# Patient Record
Sex: MALE | Race: WHITE | NOT HISPANIC OR LATINO | URBAN - METROPOLITAN AREA
[De-identification: names, ages, dates, MRNs, and addresses within clinical notes are randomized per-mention and may not be internally consistent; named-entity substitution may affect disease eponyms.]

---

## 2023-02-15 ENCOUNTER — OUTPATIENT (OUTPATIENT)
Dept: OUTPATIENT SERVICES | Facility: HOSPITAL | Age: 68
LOS: 1 days | End: 2023-02-15
Payer: MEDICARE

## 2023-02-15 DIAGNOSIS — Z11.52 ENCOUNTER FOR SCREENING FOR COVID-19: ICD-10-CM

## 2023-02-15 LAB — SARS-COV-2 RNA SPEC QL NAA+PROBE: SIGNIFICANT CHANGE UP

## 2023-02-15 PROCEDURE — U0005: CPT

## 2023-02-15 PROCEDURE — U0003: CPT

## 2023-02-15 PROCEDURE — C9803: CPT

## 2023-02-16 ENCOUNTER — TRANSCRIPTION ENCOUNTER (OUTPATIENT)
Age: 68
End: 2023-02-16

## 2023-02-16 ENCOUNTER — OUTPATIENT (OUTPATIENT)
Dept: OUTPATIENT SERVICES | Facility: HOSPITAL | Age: 68
LOS: 1 days | End: 2023-02-16
Payer: COMMERCIAL

## 2023-02-16 VITALS
OXYGEN SATURATION: 99 % | RESPIRATION RATE: 16 BRPM | HEART RATE: 56 BPM | SYSTOLIC BLOOD PRESSURE: 151 MMHG | TEMPERATURE: 98 F | DIASTOLIC BLOOD PRESSURE: 50 MMHG

## 2023-02-16 VITALS
WEIGHT: 265 LBS | TEMPERATURE: 98 F | OXYGEN SATURATION: 96 % | SYSTOLIC BLOOD PRESSURE: 173 MMHG | DIASTOLIC BLOOD PRESSURE: 73 MMHG | HEIGHT: 75 IN | HEART RATE: 58 BPM | RESPIRATION RATE: 16 BRPM

## 2023-02-16 DIAGNOSIS — Z98.890 OTHER SPECIFIED POSTPROCEDURAL STATES: Chronic | ICD-10-CM

## 2023-02-16 DIAGNOSIS — I20.0 UNSTABLE ANGINA: ICD-10-CM

## 2023-02-16 PROBLEM — Z00.00 ENCOUNTER FOR PREVENTIVE HEALTH EXAMINATION: Status: ACTIVE | Noted: 2023-02-16

## 2023-02-16 LAB
ANION GAP SERPL CALC-SCNC: 15 MMOL/L — SIGNIFICANT CHANGE UP (ref 5–17)
BUN SERPL-MCNC: 16 MG/DL — SIGNIFICANT CHANGE UP (ref 7–23)
CALCIUM SERPL-MCNC: 10 MG/DL — SIGNIFICANT CHANGE UP (ref 8.4–10.5)
CHLORIDE SERPL-SCNC: 100 MMOL/L — SIGNIFICANT CHANGE UP (ref 96–108)
CO2 SERPL-SCNC: 22 MMOL/L — SIGNIFICANT CHANGE UP (ref 22–31)
CREAT SERPL-MCNC: 0.59 MG/DL — SIGNIFICANT CHANGE UP (ref 0.5–1.3)
EGFR: 106 ML/MIN/1.73M2 — SIGNIFICANT CHANGE UP
GLUCOSE BLDC GLUCOMTR-MCNC: 165 MG/DL — HIGH (ref 70–99)
GLUCOSE BLDC GLUCOMTR-MCNC: 168 MG/DL — HIGH (ref 70–99)
GLUCOSE BLDC GLUCOMTR-MCNC: 177 MG/DL — HIGH (ref 70–99)
GLUCOSE SERPL-MCNC: 167 MG/DL — HIGH (ref 70–99)
HCT VFR BLD CALC: 44 % — SIGNIFICANT CHANGE UP (ref 39–50)
HGB BLD-MCNC: 14.4 G/DL — SIGNIFICANT CHANGE UP (ref 13–17)
MCHC RBC-ENTMCNC: 28.7 PG — SIGNIFICANT CHANGE UP (ref 27–34)
MCHC RBC-ENTMCNC: 32.7 GM/DL — SIGNIFICANT CHANGE UP (ref 32–36)
MCV RBC AUTO: 87.6 FL — SIGNIFICANT CHANGE UP (ref 80–100)
NRBC # BLD: 0 /100 WBCS — SIGNIFICANT CHANGE UP (ref 0–0)
PLATELET # BLD AUTO: 216 K/UL — SIGNIFICANT CHANGE UP (ref 150–400)
POTASSIUM SERPL-MCNC: 4 MMOL/L — SIGNIFICANT CHANGE UP (ref 3.5–5.3)
POTASSIUM SERPL-SCNC: 4 MMOL/L — SIGNIFICANT CHANGE UP (ref 3.5–5.3)
RBC # BLD: 5.02 M/UL — SIGNIFICANT CHANGE UP (ref 4.2–5.8)
RBC # FLD: 12.9 % — SIGNIFICANT CHANGE UP (ref 10.3–14.5)
SODIUM SERPL-SCNC: 137 MMOL/L — SIGNIFICANT CHANGE UP (ref 135–145)
WBC # BLD: 8.93 K/UL — SIGNIFICANT CHANGE UP (ref 3.8–10.5)
WBC # FLD AUTO: 8.93 K/UL — SIGNIFICANT CHANGE UP (ref 3.8–10.5)

## 2023-02-16 PROCEDURE — C1725: CPT

## 2023-02-16 PROCEDURE — 85027 COMPLETE CBC AUTOMATED: CPT

## 2023-02-16 PROCEDURE — 93010 ELECTROCARDIOGRAM REPORT: CPT

## 2023-02-16 PROCEDURE — C1769: CPT

## 2023-02-16 PROCEDURE — 93458 L HRT ARTERY/VENTRICLE ANGIO: CPT | Mod: 59

## 2023-02-16 PROCEDURE — 93005 ELECTROCARDIOGRAM TRACING: CPT

## 2023-02-16 PROCEDURE — 92928 PRQ TCAT PLMT NTRAC ST 1 LES: CPT | Mod: RC

## 2023-02-16 PROCEDURE — C9600: CPT | Mod: RC

## 2023-02-16 PROCEDURE — C1874: CPT

## 2023-02-16 PROCEDURE — C1894: CPT

## 2023-02-16 PROCEDURE — 93458 L HRT ARTERY/VENTRICLE ANGIO: CPT | Mod: 26,59

## 2023-02-16 PROCEDURE — C1887: CPT

## 2023-02-16 PROCEDURE — 93010 ELECTROCARDIOGRAM REPORT: CPT | Mod: 77

## 2023-02-16 PROCEDURE — 99152 MOD SED SAME PHYS/QHP 5/>YRS: CPT

## 2023-02-16 PROCEDURE — 82962 GLUCOSE BLOOD TEST: CPT

## 2023-02-16 PROCEDURE — 80048 BASIC METABOLIC PNL TOTAL CA: CPT

## 2023-02-16 RX ORDER — SODIUM CHLORIDE 9 MG/ML
3 INJECTION INTRAMUSCULAR; INTRAVENOUS; SUBCUTANEOUS EVERY 8 HOURS
Refills: 0 | Status: DISCONTINUED | OUTPATIENT
Start: 2023-02-16 | End: 2023-03-02

## 2023-02-16 RX ORDER — INSULIN LISPRO 100/ML
VIAL (ML) SUBCUTANEOUS
Refills: 0 | Status: DISCONTINUED | OUTPATIENT
Start: 2023-02-16 | End: 2023-03-02

## 2023-02-16 RX ORDER — DEXTROSE 50 % IN WATER 50 %
25 SYRINGE (ML) INTRAVENOUS ONCE
Refills: 0 | Status: DISCONTINUED | OUTPATIENT
Start: 2023-02-16 | End: 2023-03-02

## 2023-02-16 RX ORDER — DEXTROSE 50 % IN WATER 50 %
15 SYRINGE (ML) INTRAVENOUS ONCE
Refills: 0 | Status: DISCONTINUED | OUTPATIENT
Start: 2023-02-16 | End: 2023-03-02

## 2023-02-16 RX ORDER — CLOPIDOGREL BISULFATE 75 MG/1
75 TABLET, FILM COATED ORAL DAILY
Refills: 0 | Status: DISCONTINUED | OUTPATIENT
Start: 2023-02-16 | End: 2023-03-02

## 2023-02-16 RX ORDER — CLOPIDOGREL BISULFATE 75 MG/1
1 TABLET, FILM COATED ORAL
Qty: 90 | Refills: 3
Start: 2023-02-16 | End: 2024-02-10

## 2023-02-16 RX ORDER — DEXTROSE 50 % IN WATER 50 %
12.5 SYRINGE (ML) INTRAVENOUS ONCE
Refills: 0 | Status: DISCONTINUED | OUTPATIENT
Start: 2023-02-16 | End: 2023-03-02

## 2023-02-16 RX ORDER — SODIUM CHLORIDE 9 MG/ML
1000 INJECTION INTRAMUSCULAR; INTRAVENOUS; SUBCUTANEOUS
Refills: 0 | Status: DISCONTINUED | OUTPATIENT
Start: 2023-02-16 | End: 2023-03-02

## 2023-02-16 RX ORDER — ASPIRIN/CALCIUM CARB/MAGNESIUM 324 MG
81 TABLET ORAL DAILY
Refills: 0 | Status: DISCONTINUED | OUTPATIENT
Start: 2023-02-16 | End: 2023-03-02

## 2023-02-16 RX ORDER — SODIUM CHLORIDE 9 MG/ML
1000 INJECTION, SOLUTION INTRAVENOUS
Refills: 0 | Status: DISCONTINUED | OUTPATIENT
Start: 2023-02-16 | End: 2023-03-02

## 2023-02-16 RX ORDER — GLUCAGON INJECTION, SOLUTION 0.5 MG/.1ML
1 INJECTION, SOLUTION SUBCUTANEOUS ONCE
Refills: 0 | Status: DISCONTINUED | OUTPATIENT
Start: 2023-02-16 | End: 2023-03-02

## 2023-02-16 RX ADMIN — SODIUM CHLORIDE 3 MILLILITER(S): 9 INJECTION INTRAMUSCULAR; INTRAVENOUS; SUBCUTANEOUS at 13:35

## 2023-02-16 RX ADMIN — SODIUM CHLORIDE 75 MILLILITER(S): 9 INJECTION INTRAMUSCULAR; INTRAVENOUS; SUBCUTANEOUS at 10:30

## 2023-02-16 RX ADMIN — Medication 2: at 16:25

## 2023-02-16 NOTE — H&P CARDIOLOGY - HISTORY OF PRESENT ILLNESS
68 yo M with PMHx of HTN, HLD, DMT2, family hx of MI (father @ 50), Acid reflux, former smoker presents for cardiac cath. pt reports he had abnormal CTA in 11/2022 then had abnormal NST at Dr. Dick's office and recommended to have cardiac cath. Pt denies chest pain, SOB, dizziness or palpitation.     Card: Pacheco Dick    NST 2/14/23: mod abnormal study with anterior and apical ischemia without infarct EF 43%, Abnormal wall motion, with hypokinesis present. Mod sized area of moderate hypokinesis in anterior and apical walls.

## 2023-02-16 NOTE — ASU DISCHARGE PLAN (ADULT/PEDIATRIC) - CARE PROVIDER_API CALL
Pacheco Dick (DO)  Cardiology; Internal Medicine; Nuclear Cardiology  [facility], [address] [place] [address], [place] [address]  Phone: [phone]  Fax: [phone]  Established Patient  Follow Up Time: 2 weeks

## 2023-02-16 NOTE — H&P CARDIOLOGY - NSICDXPASTMEDICALHX_GEN_ALL_CORE_FT
PAST MEDICAL HISTORY:  Acid reflux     DM (diabetes mellitus)     Former smoker     HLD (hyperlipidemia)     HTN (hypertension)

## 2023-02-16 NOTE — ASU DISCHARGE PLAN (ADULT/PEDIATRIC) - NS MD DC FALL RISK RISK
For information on Fall & Injury Prevention, visit: [email or web address] OR  [email or web address] OR  https://www.cdc.gov/steadi/patient.html

## 2023-02-26 ENCOUNTER — OUTPATIENT (OUTPATIENT)
Dept: OUTPATIENT SERVICES | Facility: HOSPITAL | Age: 68
LOS: 1 days | End: 2023-02-26
Payer: COMMERCIAL

## 2023-02-26 DIAGNOSIS — Z98.890 OTHER SPECIFIED POSTPROCEDURAL STATES: Chronic | ICD-10-CM

## 2023-02-26 DIAGNOSIS — Z11.52 ENCOUNTER FOR SCREENING FOR COVID-19: ICD-10-CM

## 2023-02-26 LAB — SARS-COV-2 RNA SPEC QL NAA+PROBE: SIGNIFICANT CHANGE UP

## 2023-02-26 PROCEDURE — U0005: CPT

## 2023-02-26 PROCEDURE — C9803: CPT

## 2023-02-26 PROCEDURE — U0003: CPT

## 2023-02-27 ENCOUNTER — NON-APPOINTMENT (OUTPATIENT)
Age: 68
End: 2023-02-27

## 2023-03-01 ENCOUNTER — OUTPATIENT (OUTPATIENT)
Dept: OUTPATIENT SERVICES | Facility: HOSPITAL | Age: 68
LOS: 1 days | End: 2023-03-01
Payer: MEDICARE

## 2023-03-01 ENCOUNTER — TRANSCRIPTION ENCOUNTER (OUTPATIENT)
Age: 68
End: 2023-03-01

## 2023-03-01 VITALS
TEMPERATURE: 98 F | HEART RATE: 75 BPM | DIASTOLIC BLOOD PRESSURE: 69 MMHG | SYSTOLIC BLOOD PRESSURE: 136 MMHG | WEIGHT: 265 LBS | HEIGHT: 75 IN | RESPIRATION RATE: 15 BRPM | OXYGEN SATURATION: 97 %

## 2023-03-01 VITALS
OXYGEN SATURATION: 100 % | TEMPERATURE: 98 F | RESPIRATION RATE: 17 BRPM | DIASTOLIC BLOOD PRESSURE: 67 MMHG | SYSTOLIC BLOOD PRESSURE: 144 MMHG | HEART RATE: 50 BPM

## 2023-03-01 DIAGNOSIS — Z98.890 OTHER SPECIFIED POSTPROCEDURAL STATES: Chronic | ICD-10-CM

## 2023-03-01 DIAGNOSIS — I25.10 ATHEROSCLEROTIC HEART DISEASE OF NATIVE CORONARY ARTERY WITHOUT ANGINA PECTORIS: ICD-10-CM

## 2023-03-01 LAB
ANION GAP SERPL CALC-SCNC: 11 MMOL/L — SIGNIFICANT CHANGE UP (ref 5–17)
BUN SERPL-MCNC: 15 MG/DL — SIGNIFICANT CHANGE UP (ref 7–23)
CALCIUM SERPL-MCNC: 9.9 MG/DL — SIGNIFICANT CHANGE UP (ref 8.4–10.5)
CHLORIDE SERPL-SCNC: 102 MMOL/L — SIGNIFICANT CHANGE UP (ref 96–108)
CO2 SERPL-SCNC: 26 MMOL/L — SIGNIFICANT CHANGE UP (ref 22–31)
CREAT SERPL-MCNC: 0.62 MG/DL — SIGNIFICANT CHANGE UP (ref 0.5–1.3)
EGFR: 104 ML/MIN/1.73M2 — SIGNIFICANT CHANGE UP
GLUCOSE BLDC GLUCOMTR-MCNC: 102 MG/DL — HIGH (ref 70–99)
GLUCOSE SERPL-MCNC: 129 MG/DL — HIGH (ref 70–99)
HCT VFR BLD CALC: 43.7 % — SIGNIFICANT CHANGE UP (ref 39–50)
HGB BLD-MCNC: 14.2 G/DL — SIGNIFICANT CHANGE UP (ref 13–17)
MCHC RBC-ENTMCNC: 28.3 PG — SIGNIFICANT CHANGE UP (ref 27–34)
MCHC RBC-ENTMCNC: 32.5 GM/DL — SIGNIFICANT CHANGE UP (ref 32–36)
MCV RBC AUTO: 87.2 FL — SIGNIFICANT CHANGE UP (ref 80–100)
NRBC # BLD: 0 /100 WBCS — SIGNIFICANT CHANGE UP (ref 0–0)
PLATELET # BLD AUTO: 208 K/UL — SIGNIFICANT CHANGE UP (ref 150–400)
POTASSIUM SERPL-MCNC: 4.6 MMOL/L — SIGNIFICANT CHANGE UP (ref 3.5–5.3)
POTASSIUM SERPL-SCNC: 4.6 MMOL/L — SIGNIFICANT CHANGE UP (ref 3.5–5.3)
RBC # BLD: 5.01 M/UL — SIGNIFICANT CHANGE UP (ref 4.2–5.8)
RBC # FLD: 12.8 % — SIGNIFICANT CHANGE UP (ref 10.3–14.5)
SODIUM SERPL-SCNC: 139 MMOL/L — SIGNIFICANT CHANGE UP (ref 135–145)
WBC # BLD: 8.18 K/UL — SIGNIFICANT CHANGE UP (ref 3.8–10.5)
WBC # FLD AUTO: 8.18 K/UL — SIGNIFICANT CHANGE UP (ref 3.8–10.5)

## 2023-03-01 PROCEDURE — 92928 PRQ TCAT PLMT NTRAC ST 1 LES: CPT | Mod: LC

## 2023-03-01 PROCEDURE — C1874: CPT

## 2023-03-01 PROCEDURE — C9601: CPT | Mod: LC

## 2023-03-01 PROCEDURE — C1894: CPT

## 2023-03-01 PROCEDURE — 93010 ELECTROCARDIOGRAM REPORT: CPT

## 2023-03-01 PROCEDURE — C1725: CPT

## 2023-03-01 PROCEDURE — 36415 COLL VENOUS BLD VENIPUNCTURE: CPT

## 2023-03-01 PROCEDURE — 85027 COMPLETE CBC AUTOMATED: CPT

## 2023-03-01 PROCEDURE — 99152 MOD SED SAME PHYS/QHP 5/>YRS: CPT

## 2023-03-01 PROCEDURE — 92929: CPT | Mod: LC

## 2023-03-01 PROCEDURE — 80048 BASIC METABOLIC PNL TOTAL CA: CPT

## 2023-03-01 PROCEDURE — C9600: CPT | Mod: LC

## 2023-03-01 PROCEDURE — C1887: CPT

## 2023-03-01 PROCEDURE — 82962 GLUCOSE BLOOD TEST: CPT

## 2023-03-01 PROCEDURE — 93005 ELECTROCARDIOGRAM TRACING: CPT

## 2023-03-01 PROCEDURE — C1769: CPT

## 2023-03-01 RX ORDER — SODIUM CHLORIDE 9 MG/ML
1000 INJECTION INTRAMUSCULAR; INTRAVENOUS; SUBCUTANEOUS
Refills: 0 | Status: ACTIVE | OUTPATIENT
Start: 2023-03-01 | End: 2023-03-01

## 2023-03-01 RX ORDER — METFORMIN HYDROCHLORIDE 850 MG/1
1 TABLET ORAL
Qty: 0 | Refills: 0 | DISCHARGE

## 2023-03-01 RX ADMIN — SODIUM CHLORIDE 125 MILLILITER(S): 9 INJECTION INTRAMUSCULAR; INTRAVENOUS; SUBCUTANEOUS at 16:15

## 2023-03-01 NOTE — ASU DISCHARGE PLAN (ADULT/PEDIATRIC) - ASU DC SPECIAL INSTRUCTIONSFT
Wound Care:   the day AFTER your procedure remove bandage GENTLY, and clean using  mild soap and gentle warm, water stream, pat dry. leave OPEN to air. YOU MAY SHOWER   DO NOT apply lotions, creams, ointments, powder, perfumes to your incision site  DO NOT SOAK your site for 1 week ( no baths, no pools, no tubs, etc...)  Check  your groin and /or wrist daily. A small amount of bruising, and soarness are normal  ACTIVITY: for 24 hours   - DO NOT DRIVE  - DO NOT make any important decisions or sign legal documents   - DO NOT operate heavy machineries   - you may resume sexual activity in 48 hours, unless otherwise instructed by your cardiologist   If your procedure was done through the WRIST: for the NEXT 3DAYS:  - avoid pushing, pulling, with that affected wrist   - avoid repeated movement of that hand and wrist ( eg: typing, hammering)  - DO NOT LIFT anything more than 5 lbs   MEDICATION:   take your medications as explained ( see discharge paperwork)   If you received a STENT, you will be taking antiplatelet medications to KEEP YOUR STENT OPEN ( eg: Aspirin, Plavix, Brilinta, Effient, etc).  Take as prescribed DO NOT STOP taking them without consulting with your cardiologist first.   Follow heart healthy diet recommended by your doctor, , if you smoke STOP SMOKING ( may call [phone] for center of tobacco control if you need assistance)   CALL your doctor to make appointment in 2 WEEKS   ***CALL YOUR DOCTOR***  if you experience: fever, chills, body aches, or severe pain, swelling, redness, heat or yellow discharge at incision site  If you experience Bleeding or excruciating pain at the procedural site, swelling ( golf ball size) at your procedural site  If you experience CHEST PAIN  If you experience extremity numbness, tingling, temperature change ( of your procedural site)   If you are unable to reach your doctor, you may contact:   -Cardiology Office at [facility] at [phone] or   - [facility] [place] [facility] [phone]  - [facility] [phone]

## 2023-03-01 NOTE — H&P CARDIOLOGY - HISTORY OF PRESENT ILLNESS
Successful PCI with ERIC to RPDA and mid RCA.  Plan staged PCI of LCX  and OM in 2-3 weeks.  DAPT for 6 mths    Successful PCI with ERIC to RPDA and mid RCA.  Plan staged PCI of LCX  and OM in 2-3 weeks.  DAPT for 6 mths 68 yo M with PMHx of HTN, HLD, DMT2, family hx of MI (father @ 50), Acid reflux, former smoker presents today for staged intervention. Patient was admitted on 2/16/2023 for Successful PCI with ERIC to RPDA and mid RCA.  Returns for staged PCI of LCX  and OM  68 yo M with PMHx of HTN, HLD, DMT2, family hx of MI (father @ 50), Acid reflux, former smoker presents today for staged intervention. Patient was admitted on 2/16/2023 for Successful PCI with ERIC to RPDA and mid RCA.  Returns for staged PCI of LCX  and OM     Card: Pacheco Dick

## 2023-03-01 NOTE — H&P CARDIOLOGY - PSYCHIATRIC
Affect and characteristics of appearance, verbalizations, behaviors are appropriate
Diagnostic testing not indicated for today's encounter

## 2023-03-01 NOTE — CHART NOTE - NSCHARTNOTEFT_GEN_A_CORE
Pt noted to have 2.0 sec pause on tele: HR SB-SR. Pt is not on beta blocker, asymptomatic.   Dr Jauregui aware, will proceed for discharge later tonight if pt remains hemodynamically stable.     Diogo Perez, NP

## 2023-03-01 NOTE — ASU DISCHARGE PLAN (ADULT/PEDIATRIC) - CARE PROVIDER_API CALL
Pacheco Dick (DO)  Cardiology; Internal Medicine; Nuclear Cardiology  [facility], [address] [place] [address], [place] [address]  Phone: [phone]  Fax: [phone]  Established Patient  Follow Up Time: 2 weeks

## 2023-03-01 NOTE — H&P CARDIOLOGY - NSICDXPASTMEDICALHX_GEN_ALL_CORE_FT
PAST MEDICAL HISTORY:  CAD (coronary atherosclerotic disease)     Diabetes     H/O heart artery stent 2/2023    HLD (hyperlipidemia)     HTN (hypertension)      PAST MEDICAL HISTORY:  CAD (coronary atherosclerotic disease)     Diabetes     Former smoker     H/O heart artery stent 2/2023    HLD (hyperlipidemia)     HTN (hypertension)

## 2023-03-01 NOTE — ASU DISCHARGE PLAN (ADULT/PEDIATRIC) - NS MD DC FALL RISK RISK
For information on Fall & Injury Prevention, visit: [email or web address] OR  [email or web address] OR  https://www.cdc.gov/steadi/patient.html

## 2023-04-05 RX ORDER — METFORMIN HYDROCHLORIDE 850 MG/1
2 TABLET ORAL
Qty: 0 | Refills: 0 | DISCHARGE

## 2023-04-05 RX ORDER — METFORMIN HYDROCHLORIDE 850 MG/1
1 TABLET ORAL
Qty: 0 | Refills: 0 | DISCHARGE

## 2023-05-17 ENCOUNTER — APPOINTMENT (OUTPATIENT)
Dept: CV DIAGNOSITCS | Facility: HOSPITAL | Age: 68
End: 2023-05-17

## 2024-01-24 ENCOUNTER — APPOINTMENT (OUTPATIENT)
Dept: ELECTROPHYSIOLOGY | Facility: CLINIC | Age: 69
End: 2024-01-24
Payer: COMMERCIAL

## 2024-01-24 VITALS
SYSTOLIC BLOOD PRESSURE: 142 MMHG | DIASTOLIC BLOOD PRESSURE: 72 MMHG | WEIGHT: 279 LBS | HEART RATE: 66 BPM | HEIGHT: 75 IN | BODY MASS INDEX: 34.69 KG/M2

## 2024-01-24 DIAGNOSIS — I47.19 OTHER SUPRAVENTRICULAR TACHYCARDIA: ICD-10-CM

## 2024-01-24 PROBLEM — E78.5 HYPERLIPIDEMIA, UNSPECIFIED: Chronic | Status: ACTIVE | Noted: 2023-03-01

## 2024-01-24 PROBLEM — I25.10 ATHEROSCLEROTIC HEART DISEASE OF NATIVE CORONARY ARTERY WITHOUT ANGINA PECTORIS: Chronic | Status: ACTIVE | Noted: 2023-03-01

## 2024-01-24 PROBLEM — I10 ESSENTIAL (PRIMARY) HYPERTENSION: Chronic | Status: ACTIVE | Noted: 2023-03-01

## 2024-01-24 PROBLEM — E11.9 TYPE 2 DIABETES MELLITUS WITHOUT COMPLICATIONS: Chronic | Status: ACTIVE | Noted: 2023-02-16

## 2024-01-24 PROBLEM — I10 ESSENTIAL (PRIMARY) HYPERTENSION: Chronic | Status: ACTIVE | Noted: 2023-02-16

## 2024-01-24 PROBLEM — Z87.891 PERSONAL HISTORY OF NICOTINE DEPENDENCE: Chronic | Status: ACTIVE | Noted: 2023-02-16

## 2024-01-24 PROBLEM — E11.9 TYPE 2 DIABETES MELLITUS WITHOUT COMPLICATIONS: Chronic | Status: ACTIVE | Noted: 2023-03-01

## 2024-01-24 PROBLEM — K21.9 GASTRO-ESOPHAGEAL REFLUX DISEASE WITHOUT ESOPHAGITIS: Chronic | Status: ACTIVE | Noted: 2023-02-16

## 2024-01-24 PROBLEM — Z87.891 PERSONAL HISTORY OF NICOTINE DEPENDENCE: Chronic | Status: ACTIVE | Noted: 2023-03-01

## 2024-01-24 PROBLEM — Z95.5 PRESENCE OF CORONARY ANGIOPLASTY IMPLANT AND GRAFT: Chronic | Status: ACTIVE | Noted: 2023-03-01

## 2024-01-24 PROBLEM — E78.5 HYPERLIPIDEMIA, UNSPECIFIED: Chronic | Status: ACTIVE | Noted: 2023-02-16

## 2024-01-24 PROCEDURE — 93000 ELECTROCARDIOGRAM COMPLETE: CPT

## 2024-01-24 PROCEDURE — 99244 OFF/OP CNSLTJ NEW/EST MOD 40: CPT

## 2024-01-24 NOTE — PHYSICAL EXAM
[Well Developed] : well developed [Well Nourished] : well nourished [No Acute Distress] : no acute distress [Obese] : obese [Normal Conjunctiva] : normal conjunctiva [Normal Venous Pressure] : normal venous pressure [Normal S1, S2] : normal S1, S2 [No Murmur] : no murmur [No Gallop] : no gallop [Clear Lung Fields] : clear lung fields [Good Air Entry] : good air entry [No Respiratory Distress] : no respiratory distress  [Soft] : abdomen soft [Normal Gait] : normal gait [No Edema] : no edema [No Cyanosis] : no cyanosis [No Clubbing] : no clubbing [No Rash] : no rash [Moves all extremities] : moves all extremities [No Focal Deficits] : no focal deficits [Alert and Oriented] : alert and oriented

## 2024-01-24 NOTE — REVIEW OF SYSTEMS
[SOB] : no shortness of breath [Dyspnea on exertion] : not dyspnea during exertion [Chest Discomfort] : no chest discomfort [Lower Ext Edema] : no extremity edema [Leg Claudication] : no intermittent leg claudication [Palpitations] : no palpitations [Syncope] : no syncope [Dizziness] : no dizziness [Convulsions] : no convulsions [Negative] : Heme/Lymph

## 2024-01-24 NOTE — DISCUSSION/SUMMARY
[FreeTextEntry1] : 68 year old gentleman with history of CAD s/p PCI (last 3/23), DM, HTN, obesity, carotid disease, HLD, presenting for evaluation of atrial ectopy and suspected AF.  He has been noted to have frequent atrial ectopy, and short runs of pAT/AF on recent monitoring, with episodes lasting up to 10 seconds. These episodes are suspicious for pAF, but given the very short duration do not make criteria yet for AF diagnosis. Given his risk factors, including LA enlargement and the findings on monitoring, I do think his likelihood of having unrecognized pAF is high. He has a CHADSVASc=4, and would warrant long-term anticoagulation if AF is identified. He is currently on ASA and plavix, but with AF would warrant addition of a DOAC+ single antiplatelet therapy.  I have recommended further monitoring the clarify the diagnosis. Monitoring options including repeated long term event monitor, wearable smart monitors, or ILR implant were discussed. He had difficulty with the wearable monitor, and prefers to proceed with ILR implant. I agree ILR would have the highest sensitivity for accurate diagnosis. The risks and benefits of ILR were reviewed, and he does want to proceed.  -ILR implant. If AF clarified would start DOAC as above.   -EP followup after this [EKG obtained to assist in diagnosis and management of assessed problem(s)] : EKG obtained to assist in diagnosis and management of assessed problem(s)

## 2024-01-24 NOTE — CARDIOLOGY SUMMARY
[de-identified] : 1/24/24 sinus rhythm 66 bpm with APCs, and nonspecific ST/Tw abnormality, LA enlargement  [de-identified] : TTE 6/29/23 LVEF 58%, mild LVH, LA 4.2cm, trace MR, mild PI [de-identified] : Cath 3/1/23 dLCx 99%, pLC 70%,  OM3 80% s/p PCI Cath 2/16/23dLCx 100%, RPDA 90%, mRCA 70%. LAD mild disease. PCI to RPDA and mRCA  [de-identified] : Carotid 1/4/24- 50-79% stenosis in LICA

## 2024-01-24 NOTE — HISTORY OF PRESENT ILLNESS
[FreeTextEntry1] : 68 year old gentleman with history of CAD s/p PCI (last 3/23), DM, HTN, obesity, carotid disease, HLD, presenting for evaluation of atrial ectopy and suspected AF.   He was noted on ECG in 1/2024 to have sinus rhythm with frequent APCs and sinus pauses. Subsequently he was referred for monitoring.   An event monitor  1/11-1/14/24 revealed sinus rhythm (avg 62, range  bpm), with brief episodes of pAT (up to 20 beats, 10.9 seconds), and 3.3% PACs. He did have difficulty tolerating the monitor, He denies symptoms during monitoring.   He has been feeling generally well, but notes occasional lightheadedness. He denies palpitation, syncope, cp or dyspnea. 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 He notes stable dyspnea with strenuous exertion.   ECG today reveals sinus rhythm 66 bpm with APCs, and nonspecific ST/Tw abnormality, and LA enlargement   Current meds include ASA 81, plavix, Toprol 50, benazepril-HCTZ, atorvastatin, metformin, Ozempic, rabeprazole

## 2024-02-13 ENCOUNTER — TRANSCRIPTION ENCOUNTER (OUTPATIENT)
Age: 69
End: 2024-02-13

## 2024-02-13 ENCOUNTER — OUTPATIENT (OUTPATIENT)
Dept: OUTPATIENT SERVICES | Facility: HOSPITAL | Age: 69
LOS: 1 days | End: 2024-02-13
Payer: MEDICARE

## 2024-02-13 VITALS
SYSTOLIC BLOOD PRESSURE: 179 MMHG | HEIGHT: 75 IN | DIASTOLIC BLOOD PRESSURE: 84 MMHG | WEIGHT: 274.92 LBS | TEMPERATURE: 98 F | HEART RATE: 82 BPM | OXYGEN SATURATION: 98 % | RESPIRATION RATE: 16 BRPM

## 2024-02-13 DIAGNOSIS — I48.0 PAROXYSMAL ATRIAL FIBRILLATION: ICD-10-CM

## 2024-02-13 DIAGNOSIS — Z98.890 OTHER SPECIFIED POSTPROCEDURAL STATES: Chronic | ICD-10-CM

## 2024-02-13 PROCEDURE — 33285 INSJ SUBQ CAR RHYTHM MNTR: CPT

## 2024-02-13 PROCEDURE — C1764: CPT

## 2024-02-13 RX ORDER — ASPIRIN/CALCIUM CARB/MAGNESIUM 324 MG
1 TABLET ORAL
Qty: 0 | Refills: 0 | DISCHARGE

## 2024-02-13 RX ORDER — ATORVASTATIN CALCIUM 80 MG/1
1 TABLET, FILM COATED ORAL
Qty: 0 | Refills: 0 | DISCHARGE

## 2024-02-13 RX ORDER — OMEGA-3 ACID ETHYL ESTERS 1 G
0 CAPSULE ORAL
Qty: 0 | Refills: 0 | DISCHARGE

## 2024-02-13 RX ORDER — RABEPRAZOLE 20 MG/1
1 TABLET, DELAYED RELEASE ORAL
Qty: 0 | Refills: 0 | DISCHARGE

## 2024-02-13 RX ORDER — SEMAGLUTIDE 0.68 MG/ML
1 INJECTION, SOLUTION SUBCUTANEOUS
Refills: 0 | DISCHARGE

## 2024-02-13 RX ORDER — CEPHALEXIN 500 MG
500 CAPSULE ORAL ONCE
Refills: 0 | Status: COMPLETED | OUTPATIENT
Start: 2024-02-13 | End: 2024-02-13

## 2024-02-13 RX ORDER — METFORMIN HYDROCHLORIDE 850 MG/1
1 TABLET ORAL
Qty: 0 | Refills: 0 | DISCHARGE

## 2024-02-13 RX ORDER — CHOLECALCIFEROL (VITAMIN D3) 125 MCG
1 CAPSULE ORAL
Qty: 0 | Refills: 0 | DISCHARGE

## 2024-02-13 RX ORDER — METFORMIN HYDROCHLORIDE 850 MG/1
2 TABLET ORAL
Refills: 0 | DISCHARGE

## 2024-02-13 RX ORDER — BENAZEPRIL HYDROCHLORIDE AND HYDROCHLOROTHIAZIDE 10; 12.5 MG/1; MG/1
1 TABLET, FILM COATED ORAL
Qty: 0 | Refills: 0 | DISCHARGE

## 2024-02-13 RX ORDER — CLOPIDOGREL BISULFATE 75 MG/1
1 TABLET, FILM COATED ORAL
Qty: 0 | Refills: 0 | DISCHARGE

## 2024-02-13 RX ADMIN — Medication 500 MILLIGRAM(S): at 07:07

## 2024-02-13 NOTE — DISCHARGE NOTE NURSING/CASE MANAGEMENT/SOCIAL WORK - PATIENT PORTAL LINK FT
You can access the FollowMyHealth Patient Portal offered by [facility] by registering at the following website: [email or web address]. By joining [facility]’s FollowMyHealth portal, you will also be able to view your health information using other applications (apps) compatible with our system.

## 2024-02-13 NOTE — PROGRESS NOTE ADULT - SUBJECTIVE AND OBJECTIVE BOX
Procedure: Loop Recorder Implant  Electrophysiologist: Jose A Lackey MD    Pt doing well s/p MDT loop recorder implant. Denies complaint.     Incision: Dermabond C/D/I; no bleeding, hematoma, erythema, exudate or edema    Plan:   Resume PO intake.   Ambulate w/ assist, then progress as tolerated.     Pain control with PO analgesia PRN.   Resume home medications.   D/C home once all criteria met, with outpt f/up in 1-2 weeks.     
Pt presents for elective MDT ILR implant.  Office note from 1/24 reviewed and there have been no significant changes in pmhx or pshx.

## 2024-02-13 NOTE — ASU DISCHARGE PLAN (ADULT/PEDIATRIC) - CARE PROVIDER_API CALL
Pacheco Dick  Cardiology  [address] [facility] [address], [address]  [place] [address]  Phone: [phone]  Fax: [phone]  Follow Up Time:     Jose A Lackey  Cardiac Electrophysiology  [address] [place] [id number]  Phone: [phone]  Fax: [phone]  Follow Up Time:

## 2024-02-13 NOTE — ASU DISCHARGE PLAN (ADULT/PEDIATRIC) - ASU DC SPECIAL INSTRUCTIONSFT
Loop Recorder Incision Care:     - Do not touch the incision until it is completely healed.   - There is Dermabond (skin glue) and/or Steristrips on your incision, which will start to flake off on its own over the next 2-3 weeks. Do not pick at or peel off.   - Do not apply soaps, creams, lotions, ointments or powders to the incision until it is completely healed.  - You should call the doctor if you notice redness, drainage, swelling, increased tenderness, hot sensation around the  incision, bleeding or incision edges pulling apart.

## 2024-03-02 ENCOUNTER — EMERGENCY (EMERGENCY)
Facility: HOSPITAL | Age: 69
LOS: 1 days | Discharge: HOME | End: 2024-03-02
Attending: EMERGENCY MEDICINE
Payer: COMMERCIAL

## 2024-03-02 VITALS
WEIGHT: 265 LBS | HEIGHT: 75 IN | TEMPERATURE: 98 F | DIASTOLIC BLOOD PRESSURE: 95 MMHG | HEART RATE: 86 BPM | RESPIRATION RATE: 20 BRPM | SYSTOLIC BLOOD PRESSURE: 187 MMHG | OXYGEN SATURATION: 96 %

## 2024-03-02 VITALS
OXYGEN SATURATION: 100 % | HEART RATE: 78 BPM | SYSTOLIC BLOOD PRESSURE: 164 MMHG | RESPIRATION RATE: 16 BRPM | DIASTOLIC BLOOD PRESSURE: 75 MMHG | TEMPERATURE: 98 F

## 2024-03-02 DIAGNOSIS — Z98.890 OTHER SPECIFIED POSTPROCEDURAL STATES: Chronic | ICD-10-CM

## 2024-03-02 PROCEDURE — 93005 ELECTROCARDIOGRAM TRACING: CPT

## 2024-03-02 PROCEDURE — 99284 EMERGENCY DEPT VISIT MOD MDM: CPT | Mod: 25

## 2024-03-02 PROCEDURE — 99284 EMERGENCY DEPT VISIT MOD MDM: CPT

## 2024-03-02 RX ORDER — MECLIZINE HCL 12.5 MG
50 TABLET ORAL ONCE
Refills: 0 | Status: COMPLETED | OUTPATIENT
Start: 2024-03-02 | End: 2024-03-02

## 2024-03-02 RX ADMIN — Medication 50 MILLIGRAM(S): at 18:33

## 2024-03-02 NOTE — ED PROVIDER NOTE - PATIENT PORTAL LINK FT
You can access the FollowMyHealth Patient Portal offered by [facility] by registering at the following website: [email or web address]. By joining [facility]’s FollowMyHealth portal, you will also be able to view your health information using other applications (apps) compatible with our system.

## 2024-03-02 NOTE — ED ADULT NURSE NOTE - OBJECTIVE STATEMENT
Pt presents to ED from home. Pt complains of having "dizzy spells x2 where the room was spinning" that was better with laying down and nausea. Pt denies falling or a headstrike, no LOC. Pt had a loop recorder placed 3 weeks ago. Pt presents at baseline mental status, AAOx4. Pt denies chest pain, chest palpitations, SOB, n/v/d, fever, chills at this time. Pt placed on cardiac monitor. Plan of care and monitoring explained to pt. Bed locked and lowered for safety.

## 2024-03-02 NOTE — ED PROVIDER NOTE - ATTENDING CONTRIBUTION TO CARE
------------ATTENDING NOTE------------  pt w/ c/o sudden onset of feeling dizzy, describing room spinning vertigo this morning, worse w/ turning head / moving, improves w/ staying still, no additional numbness/weakness or loss of function, exam w/ reproducible fatiguing horizontal nystagmus, no chest pain/discomfort or sob/dyspnea or palpitations or near/syncope, no additional complaints, otherwise benign exam, tolerating PO, in depth dw pt about ddx, tx, gillespie, continued close outpt fu.  - Ramón [place] MD   ----------------------------------------------

## 2024-03-02 NOTE — ED PROVIDER NOTE - NSFOLLOWUPINSTRUCTIONS_ED_ALL_ED_FT
See your Primary Doctors and Neurology (rapid referral) next week for follow up -- call to discuss.    Stay well hydrated, eat regular healthy meals, continue current medications, get plenty of rest.    Use MECLIZINE as directed for vertigo/dizziness -- see medication warnings.    See PERIPHERAL VERTIGO information and return instructions given to you.    Seek immediate medical care for new/worsening symptoms/concerns.

## 2024-03-02 NOTE — ED PROVIDER NOTE - PHYSICAL EXAMINATION
Vitals: I have reviewed the patients vital signs  General: Well dressed, well appearing, no acute distress  HEENT: Atraumatic, normocephalic, airway patent  Eyes: EOMI, tracking appropriately  Neck: no tracheal deviation, no JVD  Chest/Lungs: no trauma, symmetric chest rise, speaking in complete sentences, no WOB  Heart: skin and extremities well perfused, regular rate and rhythm  Abdomen: soft, nontender and nondistended   Neuro: A+Ox3, ambulating without difficulty, CN II-XI intact, intact finger-to-nose exam   MSK: 5/5 strength and normal sensation in all 4 extremities   Skin: no cyanosis, no jaundice, no new emergent lesions

## 2024-03-02 NOTE — ED PROVIDER NOTE - OBJECTIVE STATEMENT
Patient is a 68 year-old-male with history of CAD s/p PCI, DMII, HTN, HLD and recent ILR implant presents with vertigo. Patient reports that he had episodes in which he felt that the room is spinning when he lied down, but self resolves. Denies fever, chills, nausea, vomiting, chest pain, shortness of breath, abdominal pain.

## 2024-03-21 ENCOUNTER — NON-APPOINTMENT (OUTPATIENT)
Age: 69
End: 2024-03-21

## 2024-03-21 ENCOUNTER — APPOINTMENT (OUTPATIENT)
Dept: OTOLARYNGOLOGY | Facility: CLINIC | Age: 69
End: 2024-03-21
Payer: COMMERCIAL

## 2024-03-21 VITALS
HEIGHT: 75 IN | SYSTOLIC BLOOD PRESSURE: 142 MMHG | DIASTOLIC BLOOD PRESSURE: 82 MMHG | BODY MASS INDEX: 32.95 KG/M2 | HEART RATE: 74 BPM | WEIGHT: 265 LBS

## 2024-03-21 DIAGNOSIS — Z78.9 OTHER SPECIFIED HEALTH STATUS: ICD-10-CM

## 2024-03-21 DIAGNOSIS — Z86.39 PERSONAL HISTORY OF OTHER ENDOCRINE, NUTRITIONAL AND METABOLIC DISEASE: ICD-10-CM

## 2024-03-21 DIAGNOSIS — Z82.49 FAMILY HISTORY OF ISCHEMIC HEART DISEASE AND OTHER DISEASES OF THE CIRCULATORY SYSTEM: ICD-10-CM

## 2024-03-21 DIAGNOSIS — H61.813 EXOSTOSIS OF EXTERNAL CANAL, BILATERAL: ICD-10-CM

## 2024-03-21 DIAGNOSIS — Z86.79 PERSONAL HISTORY OF OTHER DISEASES OF THE CIRCULATORY SYSTEM: ICD-10-CM

## 2024-03-21 DIAGNOSIS — H81.10 BENIGN PAROXYSMAL VERTIGO, UNSPECIFIED EAR: ICD-10-CM

## 2024-03-21 PROCEDURE — 99203 OFFICE O/P NEW LOW 30 MIN: CPT

## 2024-03-21 RX ORDER — ASPIRIN 325 MG
TABLET ORAL
Refills: 0 | Status: ACTIVE | COMMUNITY

## 2024-03-21 RX ORDER — RABEPRAZOLE SODIUM 20 MG/1
20 TABLET, DELAYED RELEASE ORAL
Refills: 0 | Status: ACTIVE | COMMUNITY

## 2024-03-21 RX ORDER — PSYLLIUM HUSK 0.4 G
CAPSULE ORAL
Refills: 0 | Status: ACTIVE | COMMUNITY

## 2024-03-21 RX ORDER — CLOPIDOGREL BISULFATE 300 MG/1
TABLET, FILM COATED ORAL
Refills: 0 | Status: ACTIVE | COMMUNITY

## 2024-03-21 RX ORDER — METFORMIN HYDROCHLORIDE 625 MG/1
TABLET ORAL
Refills: 0 | Status: ACTIVE | COMMUNITY

## 2024-03-21 RX ORDER — BENAZEPRIL HYDROCHLORIDE 5 MG/1
TABLET ORAL
Refills: 0 | Status: ACTIVE | COMMUNITY

## 2024-03-21 RX ORDER — METOPROLOL SUCCINATE 200 MG/1
TABLET, EXTENDED RELEASE ORAL
Refills: 0 | Status: ACTIVE | COMMUNITY

## 2024-03-21 RX ORDER — ATORVASTATIN CALCIUM 80 MG/1
TABLET, FILM COATED ORAL
Refills: 0 | Status: ACTIVE | COMMUNITY

## 2024-03-21 NOTE — PHYSICAL EXAM
[de-identified] : Multiple bilateral exostoses, left worse than right, without significant narrowing of external auditory canals. [Midline] : trachea located in midline position [Normal] : no rashes

## 2024-03-21 NOTE — ASSESSMENT
[FreeTextEntry1] : Moe Ruff presents for evaluation. He had recent vertigo that resolved quickly. ED notes were reviewed. This appears to have been positional vertigo and has not recurred. Otoscopic exam revealed bilateral exostoses, non-obstructive, associated with patient's cold water exposure from surfing. We discussed further workup of vertigo if it recurs, including audiogram, VNG, and possible imaging. He will follow up if it does recur.  - Follow up prn.

## 2024-03-21 NOTE — HISTORY OF PRESENT ILLNESS
[de-identified] : Moe Ruff is a 67 yo male with hx CAD s/p stent placement and loop recorder who presents for evaluation of dizziness. He notes that about five days ago, he developed dizziness. He then had an episode of vertigo when laying down in bed that lasted for seconds, resolved when sitting up. He then went to the ED and was told to see ENT. He has not had further episodes of vertigo or dizziness. He denies preceding head trauma or URI. He denies otalgia, otorrhea, tinnitus, or hearing loss. He denies history of recurrent ear infections. He denies facial weakness or facial numbness. He denies weakness or numbness of extremities. No recent fevers or chills.

## 2024-04-07 ENCOUNTER — NON-APPOINTMENT (OUTPATIENT)
Age: 69
End: 2024-04-07

## 2024-11-20 ENCOUNTER — APPOINTMENT (OUTPATIENT)
Dept: ELECTROPHYSIOLOGY | Facility: CLINIC | Age: 69
End: 2024-11-20
Payer: COMMERCIAL

## 2024-11-20 VITALS
BODY MASS INDEX: 32.33 KG/M2 | HEIGHT: 75 IN | HEART RATE: 68 BPM | WEIGHT: 260 LBS | DIASTOLIC BLOOD PRESSURE: 80 MMHG | SYSTOLIC BLOOD PRESSURE: 150 MMHG

## 2024-11-20 PROCEDURE — 93000 ELECTROCARDIOGRAM COMPLETE: CPT

## 2024-11-20 PROCEDURE — 99215 OFFICE O/P EST HI 40 MIN: CPT | Mod: 25

## 2025-01-23 ENCOUNTER — APPOINTMENT (OUTPATIENT)
Dept: VASCULAR SURGERY | Facility: CLINIC | Age: 70
End: 2025-01-23
Payer: COMMERCIAL

## 2025-01-23 PROCEDURE — 99204 OFFICE O/P NEW MOD 45 MIN: CPT

## 2025-01-23 PROCEDURE — 93880 EXTRACRANIAL BILAT STUDY: CPT

## 2025-03-27 NOTE — H&P CARDIOLOGY - TEMPERATURE IN FAHRENHEIT (DEGREES F)
No protocol for requested medication.    Medication: warfarin (COUMADIN) 5 MG tablet   Last office visit date: 03/18/2025  Pharmacy: [id number] - [facility] [address]    Order pended, routed to clinician for review.    97.5

## 2025-05-21 ENCOUNTER — APPOINTMENT (OUTPATIENT)
Dept: ELECTROPHYSIOLOGY | Facility: CLINIC | Age: 70
End: 2025-05-21
Payer: MEDICARE

## 2025-05-21 VITALS
HEIGHT: 75 IN | HEART RATE: 46 BPM | WEIGHT: 258 LBS | BODY MASS INDEX: 32.08 KG/M2 | SYSTOLIC BLOOD PRESSURE: 130 MMHG | DIASTOLIC BLOOD PRESSURE: 70 MMHG

## 2025-05-21 DIAGNOSIS — I47.19 OTHER SUPRAVENTRICULAR TACHYCARDIA: ICD-10-CM

## 2025-05-21 PROCEDURE — 99215 OFFICE O/P EST HI 40 MIN: CPT

## 2025-05-21 PROCEDURE — 93000 ELECTROCARDIOGRAM COMPLETE: CPT | Mod: 59

## 2025-07-22 ENCOUNTER — NON-APPOINTMENT (OUTPATIENT)
Age: 70
End: 2025-07-22

## 2025-07-28 ENCOUNTER — APPOINTMENT (OUTPATIENT)
Dept: CT IMAGING | Facility: CLINIC | Age: 70
End: 2025-07-28
Payer: COMMERCIAL

## 2025-07-28 PROCEDURE — 75572 CT HRT W/3D IMAGE: CPT

## 2025-07-29 ENCOUNTER — APPOINTMENT (OUTPATIENT)
Dept: VASCULAR SURGERY | Facility: CLINIC | Age: 70
End: 2025-07-29
Payer: COMMERCIAL

## 2025-07-29 DIAGNOSIS — I65.22 OCCLUSION AND STENOSIS OF LEFT CAROTID ARTERY: ICD-10-CM

## 2025-07-29 PROCEDURE — 93880 EXTRACRANIAL BILAT STUDY: CPT

## 2025-07-29 PROCEDURE — 99213 OFFICE O/P EST LOW 20 MIN: CPT

## 2025-08-19 ENCOUNTER — OUTPATIENT (OUTPATIENT)
Dept: OUTPATIENT SERVICES | Facility: HOSPITAL | Age: 70
LOS: 1 days | End: 2025-08-19
Payer: MEDICARE

## 2025-08-19 VITALS
HEIGHT: 75 IN | TEMPERATURE: 99 F | HEART RATE: 68 BPM | DIASTOLIC BLOOD PRESSURE: 80 MMHG | SYSTOLIC BLOOD PRESSURE: 169 MMHG | RESPIRATION RATE: 18 BRPM | WEIGHT: 255.07 LBS | OXYGEN SATURATION: 99 %

## 2025-08-19 DIAGNOSIS — Z98.890 OTHER SPECIFIED POSTPROCEDURAL STATES: Chronic | ICD-10-CM

## 2025-08-19 DIAGNOSIS — Z01.818 ENCOUNTER FOR OTHER PREPROCEDURAL EXAMINATION: ICD-10-CM

## 2025-08-19 LAB
24R-OH-CALCIDIOL SERPL-MCNC: 43.3 NG/ML — SIGNIFICANT CHANGE UP
A1C WITH ESTIMATED AVERAGE GLUCOSE RESULT: 6.8 % — HIGH (ref 4–5.6)
ALBUMIN SERPL ELPH-MCNC: 4.2 G/DL — SIGNIFICANT CHANGE UP (ref 3.3–5.2)
ALP SERPL-CCNC: 74 U/L — SIGNIFICANT CHANGE UP (ref 40–120)
ALT FLD-CCNC: 21 U/L — SIGNIFICANT CHANGE UP
ANION GAP SERPL CALC-SCNC: 14 MMOL/L — SIGNIFICANT CHANGE UP (ref 5–17)
APTT BLD: 33.3 SEC — SIGNIFICANT CHANGE UP (ref 26.1–36.8)
AST SERPL-CCNC: 23 U/L — SIGNIFICANT CHANGE UP
BILIRUB SERPL-MCNC: 0.5 MG/DL — SIGNIFICANT CHANGE UP (ref 0.4–2)
BLD GP AB SCN SERPL QL: SIGNIFICANT CHANGE UP
BUN SERPL-MCNC: 11.6 MG/DL — SIGNIFICANT CHANGE UP (ref 8–20)
CALCIUM SERPL-MCNC: 9.6 MG/DL — SIGNIFICANT CHANGE UP (ref 8.4–10.5)
CHLORIDE SERPL-SCNC: 100 MMOL/L — SIGNIFICANT CHANGE UP (ref 96–108)
CHOLEST SERPL-MCNC: 129 MG/DL — SIGNIFICANT CHANGE UP
CO2 SERPL-SCNC: 22 MMOL/L — SIGNIFICANT CHANGE UP (ref 22–29)
CREAT SERPL-MCNC: 0.57 MG/DL — SIGNIFICANT CHANGE UP (ref 0.5–1.3)
EGFR: 105 ML/MIN/1.73M2 — SIGNIFICANT CHANGE UP
EGFR: 105 ML/MIN/1.73M2 — SIGNIFICANT CHANGE UP
ESTIMATED AVERAGE GLUCOSE: 148 MG/DL — HIGH (ref 68–114)
GLUCOSE SERPL-MCNC: 109 MG/DL — HIGH (ref 70–99)
HCT VFR BLD CALC: 41.8 % — SIGNIFICANT CHANGE UP (ref 39–50)
HDLC SERPL-MCNC: 52 MG/DL — SIGNIFICANT CHANGE UP
HGB BLD-MCNC: 13.8 G/DL — SIGNIFICANT CHANGE UP (ref 13–17)
INR BLD: 1.06 RATIO — SIGNIFICANT CHANGE UP (ref 0.85–1.16)
LDLC SERPL-MCNC: 62 MG/DL — SIGNIFICANT CHANGE UP
LIPID PNL WITH DIRECT LDL SERPL: 62 MG/DL — SIGNIFICANT CHANGE UP
MAGNESIUM SERPL-MCNC: 1.9 MG/DL — SIGNIFICANT CHANGE UP (ref 1.6–2.6)
MCHC RBC-ENTMCNC: 28.2 PG — SIGNIFICANT CHANGE UP (ref 27–34)
MCHC RBC-ENTMCNC: 33 G/DL — SIGNIFICANT CHANGE UP (ref 32–36)
MCV RBC AUTO: 85.5 FL — SIGNIFICANT CHANGE UP (ref 80–100)
NONHDLC SERPL-MCNC: 77 MG/DL — SIGNIFICANT CHANGE UP
NRBC # BLD AUTO: 0 K/UL — SIGNIFICANT CHANGE UP (ref 0–0)
NRBC # FLD: 0 K/UL — SIGNIFICANT CHANGE UP (ref 0–0)
NRBC BLD AUTO-RTO: 0 /100 WBCS — SIGNIFICANT CHANGE UP (ref 0–0)
PLATELET # BLD AUTO: 219 K/UL — SIGNIFICANT CHANGE UP (ref 150–400)
PMV BLD: 10.9 FL — SIGNIFICANT CHANGE UP (ref 7–13)
POTASSIUM SERPL-MCNC: 4 MMOL/L — SIGNIFICANT CHANGE UP (ref 3.5–5.3)
POTASSIUM SERPL-SCNC: 4 MMOL/L — SIGNIFICANT CHANGE UP (ref 3.5–5.3)
PROT SERPL-MCNC: 7.3 G/DL — SIGNIFICANT CHANGE UP (ref 6.6–8.7)
PROTHROM AB SERPL-ACNC: 12.3 SEC — SIGNIFICANT CHANGE UP (ref 9.9–13.4)
RBC # BLD: 4.89 M/UL — SIGNIFICANT CHANGE UP (ref 4.2–5.8)
RBC # FLD: 13.5 % — SIGNIFICANT CHANGE UP (ref 10.3–14.5)
SODIUM SERPL-SCNC: 136 MMOL/L — SIGNIFICANT CHANGE UP (ref 135–145)
TRIGL SERPL-MCNC: 77 MG/DL — SIGNIFICANT CHANGE UP
TSH SERPL-MCNC: 0.73 UIU/ML — SIGNIFICANT CHANGE UP (ref 0.27–4.2)
VIT B12 SERPL-MCNC: 638 PG/ML — SIGNIFICANT CHANGE UP (ref 232–1245)
WBC # BLD: 7.25 K/UL — SIGNIFICANT CHANGE UP (ref 3.8–10.5)
WBC # FLD AUTO: 7.25 K/UL — SIGNIFICANT CHANGE UP (ref 3.8–10.5)

## 2025-08-19 PROCEDURE — G0463: CPT

## 2025-08-19 PROCEDURE — 86850 RBC ANTIBODY SCREEN: CPT

## 2025-08-19 PROCEDURE — 93010 ELECTROCARDIOGRAM REPORT: CPT

## 2025-08-19 PROCEDURE — 80061 LIPID PANEL: CPT

## 2025-08-19 PROCEDURE — 85027 COMPLETE CBC AUTOMATED: CPT

## 2025-08-19 PROCEDURE — 82607 VITAMIN B-12: CPT

## 2025-08-19 PROCEDURE — 80053 COMPREHEN METABOLIC PANEL: CPT

## 2025-08-19 PROCEDURE — 85730 THROMBOPLASTIN TIME PARTIAL: CPT

## 2025-08-19 PROCEDURE — 86900 BLOOD TYPING SEROLOGIC ABO: CPT

## 2025-08-19 PROCEDURE — 84443 ASSAY THYROID STIM HORMONE: CPT

## 2025-08-19 PROCEDURE — 82306 VITAMIN D 25 HYDROXY: CPT

## 2025-08-19 PROCEDURE — 85610 PROTHROMBIN TIME: CPT

## 2025-08-19 PROCEDURE — 86901 BLOOD TYPING SEROLOGIC RH(D): CPT

## 2025-08-19 PROCEDURE — 36415 COLL VENOUS BLD VENIPUNCTURE: CPT

## 2025-08-19 PROCEDURE — 93005 ELECTROCARDIOGRAM TRACING: CPT

## 2025-08-19 PROCEDURE — 83735 ASSAY OF MAGNESIUM: CPT

## 2025-08-19 PROCEDURE — 83036 HEMOGLOBIN GLYCOSYLATED A1C: CPT

## 2025-08-19 RX ORDER — RIVAROXABAN 10 MG/1
1 TABLET, FILM COATED ORAL
Refills: 0 | DISCHARGE

## 2025-08-26 ENCOUNTER — TRANSCRIPTION ENCOUNTER (OUTPATIENT)
Age: 70
End: 2025-08-26

## 2025-08-26 ENCOUNTER — INPATIENT (INPATIENT)
Facility: HOSPITAL | Age: 70
LOS: 0 days | Discharge: HOME | DRG: 317 | End: 2025-08-27
Attending: STUDENT IN AN ORGANIZED HEALTH CARE EDUCATION/TRAINING PROGRAM | Admitting: STUDENT IN AN ORGANIZED HEALTH CARE EDUCATION/TRAINING PROGRAM
Payer: MEDICARE

## 2025-08-26 VITALS
SYSTOLIC BLOOD PRESSURE: 168 MMHG | RESPIRATION RATE: 16 BRPM | TEMPERATURE: 98 F | DIASTOLIC BLOOD PRESSURE: 81 MMHG | OXYGEN SATURATION: 98 % | HEART RATE: 57 BPM

## 2025-08-26 DIAGNOSIS — I48.0 PAROXYSMAL ATRIAL FIBRILLATION: ICD-10-CM

## 2025-08-26 DIAGNOSIS — Z98.890 OTHER SPECIFIED POSTPROCEDURAL STATES: Chronic | ICD-10-CM

## 2025-08-26 LAB
ABO RH CONFIRMATION: SIGNIFICANT CHANGE UP
GLUCOSE BLDC GLUCOMTR-MCNC: 138 MG/DL — HIGH (ref 70–99)
GLUCOSE BLDC GLUCOMTR-MCNC: 141 MG/DL — HIGH (ref 70–99)
GLUCOSE BLDC GLUCOMTR-MCNC: 158 MG/DL — HIGH (ref 70–99)

## 2025-08-26 PROCEDURE — 36415 COLL VENOUS BLD VENIPUNCTURE: CPT

## 2025-08-26 PROCEDURE — 93005 ELECTROCARDIOGRAM TRACING: CPT

## 2025-08-26 PROCEDURE — 82962 GLUCOSE BLOOD TEST: CPT

## 2025-08-26 RX ORDER — DEXTROSE 50 % IN WATER 50 %
12.5 SYRINGE (ML) INTRAVENOUS ONCE
Refills: 0 | Status: DISCONTINUED | OUTPATIENT
Start: 2025-08-26 | End: 2025-08-27

## 2025-08-26 RX ORDER — SODIUM CHLORIDE 9 G/1000ML
1000 INJECTION, SOLUTION INTRAVENOUS
Refills: 0 | Status: DISCONTINUED | OUTPATIENT
Start: 2025-08-26 | End: 2025-08-27

## 2025-08-26 RX ORDER — ALPRAZOLAM 0.5 MG
0.25 TABLET, EXTENDED RELEASE 24 HR ORAL EVERY 8 HOURS
Refills: 0 | Status: DISCONTINUED | OUTPATIENT
Start: 2025-08-26 | End: 2025-08-27

## 2025-08-26 RX ORDER — INSULIN LISPRO 100 U/ML
INJECTION, SOLUTION INTRAVENOUS; SUBCUTANEOUS
Refills: 0 | Status: DISCONTINUED | OUTPATIENT
Start: 2025-08-26 | End: 2025-08-27

## 2025-08-26 RX ORDER — MAGNESIUM, ALUMINUM HYDROXIDE 200-200 MG
30 TABLET,CHEWABLE ORAL EVERY 6 HOURS
Refills: 0 | Status: DISCONTINUED | OUTPATIENT
Start: 2025-08-26 | End: 2025-08-27

## 2025-08-26 RX ORDER — CLOPIDOGREL BISULFATE 75 MG/1
75 TABLET, FILM COATED ORAL DAILY
Refills: 0 | Status: DISCONTINUED | OUTPATIENT
Start: 2025-08-26 | End: 2025-08-27

## 2025-08-26 RX ORDER — ACETAMINOPHEN 500 MG/5ML
975 LIQUID (ML) ORAL EVERY 8 HOURS
Refills: 0 | Status: DISCONTINUED | OUTPATIENT
Start: 2025-08-26 | End: 2025-08-27

## 2025-08-26 RX ORDER — ATORVASTATIN CALCIUM 80 MG/1
40 TABLET, FILM COATED ORAL AT BEDTIME
Refills: 0 | Status: DISCONTINUED | OUTPATIENT
Start: 2025-08-26 | End: 2025-08-27

## 2025-08-26 RX ORDER — METFORMIN HYDROCHLORIDE 850 MG/1
1000 TABLET ORAL
Refills: 0 | Status: DISCONTINUED | OUTPATIENT
Start: 2025-08-26 | End: 2025-08-27

## 2025-08-26 RX ORDER — METOPROLOL SUCCINATE 50 MG/1
1 TABLET, EXTENDED RELEASE ORAL
Refills: 0 | DISCHARGE

## 2025-08-26 RX ORDER — DEXTROSE 50 % IN WATER 50 %
25 SYRINGE (ML) INTRAVENOUS ONCE
Refills: 0 | Status: DISCONTINUED | OUTPATIENT
Start: 2025-08-26 | End: 2025-08-27

## 2025-08-26 RX ORDER — DEXTROSE 50 % IN WATER 50 %
15 SYRINGE (ML) INTRAVENOUS ONCE
Refills: 0 | Status: DISCONTINUED | OUTPATIENT
Start: 2025-08-26 | End: 2025-08-27

## 2025-08-26 RX ORDER — ONDANSETRON HCL/PF 4 MG/2 ML
4 VIAL (ML) INJECTION EVERY 8 HOURS
Refills: 0 | Status: DISCONTINUED | OUTPATIENT
Start: 2025-08-26 | End: 2025-08-27

## 2025-08-26 RX ORDER — GLUCAGON 3 MG/1
1 POWDER NASAL ONCE
Refills: 0 | Status: DISCONTINUED | OUTPATIENT
Start: 2025-08-26 | End: 2025-08-27

## 2025-08-26 RX ORDER — OXYCODONE HYDROCHLORIDE 30 MG/1
5 TABLET ORAL EVERY 8 HOURS
Refills: 0 | Status: DISCONTINUED | OUTPATIENT
Start: 2025-08-26 | End: 2025-08-27

## 2025-08-26 RX ORDER — METOPROLOL SUCCINATE 50 MG/1
25 TABLET, EXTENDED RELEASE ORAL DAILY
Refills: 0 | Status: DISCONTINUED | OUTPATIENT
Start: 2025-08-27 | End: 2025-08-27

## 2025-08-26 RX ORDER — INSULIN LISPRO 100 U/ML
INJECTION, SOLUTION INTRAVENOUS; SUBCUTANEOUS AT BEDTIME
Refills: 0 | Status: DISCONTINUED | OUTPATIENT
Start: 2025-08-26 | End: 2025-08-27

## 2025-08-26 RX ORDER — RIVAROXABAN 10 MG/1
20 TABLET, FILM COATED ORAL
Refills: 0 | Status: DISCONTINUED | OUTPATIENT
Start: 2025-08-26 | End: 2025-08-27

## 2025-08-26 RX ADMIN — RIVAROXABAN 20 MILLIGRAM(S): 10 TABLET, FILM COATED ORAL at 15:32

## 2025-08-26 RX ADMIN — ATORVASTATIN CALCIUM 40 MILLIGRAM(S): 80 TABLET, FILM COATED ORAL at 21:25

## 2025-08-26 RX ADMIN — Medication 50 MILLILITER(S): at 12:37

## 2025-08-26 RX ADMIN — METFORMIN HYDROCHLORIDE 1000 MILLIGRAM(S): 850 TABLET ORAL at 18:09

## 2025-08-27 ENCOUNTER — TRANSCRIPTION ENCOUNTER (OUTPATIENT)
Age: 70
End: 2025-08-27

## 2025-08-27 VITALS
DIASTOLIC BLOOD PRESSURE: 71 MMHG | HEART RATE: 64 BPM | RESPIRATION RATE: 15 BRPM | SYSTOLIC BLOOD PRESSURE: 145 MMHG | OXYGEN SATURATION: 99 %

## 2025-08-27 LAB
A1C WITH ESTIMATED AVERAGE GLUCOSE RESULT: 6.6 % — HIGH (ref 4–5.6)
ANION GAP SERPL CALC-SCNC: 11 MMOL/L — SIGNIFICANT CHANGE UP (ref 5–17)
BUN SERPL-MCNC: 16.9 MG/DL — SIGNIFICANT CHANGE UP (ref 8–20)
CALCIUM SERPL-MCNC: 9.1 MG/DL — SIGNIFICANT CHANGE UP (ref 8.4–10.5)
CHLORIDE SERPL-SCNC: 103 MMOL/L — SIGNIFICANT CHANGE UP (ref 96–108)
CO2 SERPL-SCNC: 24 MMOL/L — SIGNIFICANT CHANGE UP (ref 22–29)
CREAT SERPL-MCNC: 0.61 MG/DL — SIGNIFICANT CHANGE UP (ref 0.5–1.3)
EGFR: 103 ML/MIN/1.73M2 — SIGNIFICANT CHANGE UP
EGFR: 103 ML/MIN/1.73M2 — SIGNIFICANT CHANGE UP
ESTIMATED AVERAGE GLUCOSE: 143 MG/DL — HIGH (ref 68–114)
GLUCOSE BLDC GLUCOMTR-MCNC: 113 MG/DL — HIGH (ref 70–99)
GLUCOSE BLDC GLUCOMTR-MCNC: 157 MG/DL — HIGH (ref 70–99)
GLUCOSE SERPL-MCNC: 128 MG/DL — HIGH (ref 70–99)
HCT VFR BLD CALC: 39.4 % — SIGNIFICANT CHANGE UP (ref 39–50)
HGB BLD-MCNC: 12.8 G/DL — LOW (ref 13–17)
MAGNESIUM SERPL-MCNC: 1.9 MG/DL — SIGNIFICANT CHANGE UP (ref 1.6–2.6)
MCHC RBC-ENTMCNC: 28.2 PG — SIGNIFICANT CHANGE UP (ref 27–34)
MCHC RBC-ENTMCNC: 32.5 G/DL — SIGNIFICANT CHANGE UP (ref 32–36)
MCV RBC AUTO: 86.8 FL — SIGNIFICANT CHANGE UP (ref 80–100)
NRBC # BLD AUTO: 0 K/UL — SIGNIFICANT CHANGE UP (ref 0–0)
NRBC # FLD: 0 K/UL — SIGNIFICANT CHANGE UP (ref 0–0)
NRBC BLD AUTO-RTO: 0 /100 WBCS — SIGNIFICANT CHANGE UP (ref 0–0)
PLATELET # BLD AUTO: 210 K/UL — SIGNIFICANT CHANGE UP (ref 150–400)
PMV BLD: 10.8 FL — SIGNIFICANT CHANGE UP (ref 7–13)
POTASSIUM SERPL-MCNC: 4.6 MMOL/L — SIGNIFICANT CHANGE UP (ref 3.5–5.3)
POTASSIUM SERPL-SCNC: 4.6 MMOL/L — SIGNIFICANT CHANGE UP (ref 3.5–5.3)
RBC # BLD: 4.54 M/UL — SIGNIFICANT CHANGE UP (ref 4.2–5.8)
RBC # FLD: 14.2 % — SIGNIFICANT CHANGE UP (ref 10.3–14.5)
SODIUM SERPL-SCNC: 137 MMOL/L — SIGNIFICANT CHANGE UP (ref 135–145)
WBC # BLD: 15.3 K/UL — HIGH (ref 3.8–10.5)
WBC # FLD AUTO: 15.3 K/UL — HIGH (ref 3.8–10.5)

## 2025-08-27 PROCEDURE — 93656 COMPRE EP EVAL ABLTJ ATR FIB: CPT

## 2025-08-27 PROCEDURE — 83036 HEMOGLOBIN GLYCOSYLATED A1C: CPT

## 2025-08-27 PROCEDURE — 80048 BASIC METABOLIC PNL TOTAL CA: CPT

## 2025-08-27 PROCEDURE — C1889: CPT

## 2025-08-27 PROCEDURE — C1893: CPT

## 2025-08-27 PROCEDURE — 36415 COLL VENOUS BLD VENIPUNCTURE: CPT

## 2025-08-27 PROCEDURE — C1887: CPT

## 2025-08-27 PROCEDURE — 93005 ELECTROCARDIOGRAM TRACING: CPT

## 2025-08-27 PROCEDURE — C1766: CPT

## 2025-08-27 PROCEDURE — C1894: CPT

## 2025-08-27 PROCEDURE — C1759: CPT

## 2025-08-27 PROCEDURE — C1733: CPT

## 2025-08-27 PROCEDURE — C1769: CPT

## 2025-08-27 PROCEDURE — 93657 TX L/R ATRIAL FIB ADDL: CPT

## 2025-08-27 PROCEDURE — 85027 COMPLETE CBC AUTOMATED: CPT

## 2025-08-27 PROCEDURE — 33340 PERQ CLSR TCAT L ATR APNDGE: CPT

## 2025-08-27 PROCEDURE — C9399: CPT

## 2025-08-27 PROCEDURE — 82962 GLUCOSE BLOOD TEST: CPT

## 2025-08-27 PROCEDURE — C1731: CPT

## 2025-08-27 PROCEDURE — C1732: CPT

## 2025-08-27 PROCEDURE — 93010 ELECTROCARDIOGRAM REPORT: CPT

## 2025-08-27 PROCEDURE — 83735 ASSAY OF MAGNESIUM: CPT

## 2025-08-27 RX ORDER — MAGNESIUM SULFATE 500 MG/ML
2 SYRINGE (ML) INJECTION ONCE
Refills: 0 | Status: COMPLETED | OUTPATIENT
Start: 2025-08-27 | End: 2025-08-27

## 2025-08-27 RX ADMIN — Medication 40 MILLIGRAM(S): at 05:05

## 2025-08-27 RX ADMIN — METOPROLOL SUCCINATE 25 MILLIGRAM(S): 50 TABLET, EXTENDED RELEASE ORAL at 08:22

## 2025-08-27 RX ADMIN — METFORMIN HYDROCHLORIDE 1000 MILLIGRAM(S): 850 TABLET ORAL at 08:22

## 2025-08-27 RX ADMIN — Medication 50 GRAM(S): at 07:46

## 2025-08-27 RX ADMIN — CLOPIDOGREL BISULFATE 75 MILLIGRAM(S): 75 TABLET, FILM COATED ORAL at 08:23

## 2025-09-17 ENCOUNTER — APPOINTMENT (OUTPATIENT)
Dept: ELECTROPHYSIOLOGY | Facility: CLINIC | Age: 70
End: 2025-09-17
Payer: COMMERCIAL

## 2025-09-17 VITALS
HEIGHT: 75 IN | DIASTOLIC BLOOD PRESSURE: 70 MMHG | SYSTOLIC BLOOD PRESSURE: 144 MMHG | HEART RATE: 54 BPM | WEIGHT: 262 LBS | BODY MASS INDEX: 32.58 KG/M2

## 2025-09-17 DIAGNOSIS — I47.19 OTHER SUPRAVENTRICULAR TACHYCARDIA: ICD-10-CM

## 2025-09-17 PROCEDURE — 99214 OFFICE O/P EST MOD 30 MIN: CPT | Mod: 25

## 2025-09-17 PROCEDURE — 93000 ELECTROCARDIOGRAM COMPLETE: CPT
